# Patient Record
Sex: FEMALE | Race: BLACK OR AFRICAN AMERICAN | NOT HISPANIC OR LATINO
[De-identification: names, ages, dates, MRNs, and addresses within clinical notes are randomized per-mention and may not be internally consistent; named-entity substitution may affect disease eponyms.]

---

## 2018-02-12 ENCOUNTER — RESULT REVIEW (OUTPATIENT)
Age: 28
End: 2018-02-12

## 2018-02-12 ENCOUNTER — OUTPATIENT (OUTPATIENT)
Dept: OUTPATIENT SERVICES | Facility: HOSPITAL | Age: 28
LOS: 1 days | Discharge: HOME | End: 2018-02-12

## 2018-02-12 DIAGNOSIS — Z01.419 ENCOUNTER FOR GYNECOLOGICAL EXAMINATION (GENERAL) (ROUTINE) WITHOUT ABNORMAL FINDINGS: ICD-10-CM

## 2019-02-05 PROBLEM — Z00.00 ENCOUNTER FOR PREVENTIVE HEALTH EXAMINATION: Status: ACTIVE | Noted: 2019-02-05

## 2019-02-15 ENCOUNTER — OUTPATIENT (OUTPATIENT)
Dept: OUTPATIENT SERVICES | Facility: HOSPITAL | Age: 29
LOS: 1 days | Discharge: HOME | End: 2019-02-15

## 2019-02-15 ENCOUNTER — APPOINTMENT (OUTPATIENT)
Dept: OBGYN | Facility: CLINIC | Age: 29
End: 2019-02-15

## 2019-02-15 ENCOUNTER — TRANSCRIPTION ENCOUNTER (OUTPATIENT)
Age: 29
End: 2019-02-15

## 2019-02-15 VITALS
DIASTOLIC BLOOD PRESSURE: 77 MMHG | BODY MASS INDEX: 31.34 KG/M2 | HEIGHT: 66 IN | SYSTOLIC BLOOD PRESSURE: 117 MMHG | WEIGHT: 195 LBS

## 2019-02-15 DIAGNOSIS — Z01.419 ENCOUNTER FOR GYNECOLOGICAL EXAMINATION (GENERAL) (ROUTINE) WITHOUT ABNORMAL FINDINGS: ICD-10-CM

## 2019-02-15 DIAGNOSIS — Z86.03 PERSONAL HISTORY OF NEOPLASM OF UNCERTAIN BEHAVIOR: ICD-10-CM

## 2019-02-15 NOTE — PROCEDURE
[Pelvic Pain] : pelvic pain [Fibroid Uterus] : fibroid uterus [Pelvic Sonogram] : pelvic sonogram [Transvaginal Ultrasound] : transvaginal ultrasound [Present] : uterus present [Anteverted] : anteverted [L: ___ cm] : L: [unfilled] cm [W: ___cm] : W: [unfilled] cm [FreeTextEntry6] : borderline ovarian ca  [FreeTextEntry7] : surgical absent [FreeTextEntry8] : 5.51 [FreeTextEntry4] : fibroid uterus

## 2019-02-19 LAB
C TRACH RRNA SPEC QL NAA+PROBE: NOT DETECTED
N GONORRHOEA RRNA SPEC QL NAA+PROBE: NOT DETECTED
SOURCE AMPLIFICATION: NORMAL

## 2019-11-19 ENCOUNTER — TRANSCRIPTION ENCOUNTER (OUTPATIENT)
Age: 29
End: 2019-11-19

## 2019-11-26 ENCOUNTER — OUTPATIENT (OUTPATIENT)
Dept: OUTPATIENT SERVICES | Facility: HOSPITAL | Age: 29
LOS: 1 days | Discharge: HOME | End: 2019-11-26
Payer: COMMERCIAL

## 2019-11-26 DIAGNOSIS — R10.2 PELVIC AND PERINEAL PAIN: ICD-10-CM

## 2019-11-26 PROCEDURE — 76856 US EXAM PELVIC COMPLETE: CPT | Mod: 26

## 2019-11-26 PROCEDURE — 76830 TRANSVAGINAL US NON-OB: CPT | Mod: 26

## 2019-12-17 ENCOUNTER — APPOINTMENT (OUTPATIENT)
Dept: OBGYN | Facility: CLINIC | Age: 29
End: 2019-12-17
Payer: COMMERCIAL

## 2019-12-17 VITALS
WEIGHT: 190 LBS | HEIGHT: 66 IN | BODY MASS INDEX: 30.53 KG/M2 | DIASTOLIC BLOOD PRESSURE: 77 MMHG | SYSTOLIC BLOOD PRESSURE: 120 MMHG

## 2019-12-17 DIAGNOSIS — N92.6 IRREGULAR MENSTRUATION, UNSPECIFIED: ICD-10-CM

## 2019-12-17 PROCEDURE — 99213 OFFICE O/P EST LOW 20 MIN: CPT

## 2019-12-17 NOTE — PROCEDURE
[Pelvic Mass] : pelvic mass [Fibroid Uterus] : fibroid uterus [Pelvic Sonogram] : pelvic sonogram [Present] : uterus present [Anteverted] : anteverted [L: ___ cm] : L: [unfilled] cm [W: ___cm] : W: [unfilled] cm [FreeTextEntry7] : abscent [FreeTextEntry8] : 5.74  3.86 cyst [FreeTextEntry4] : fibroid uterus with previous borderlin ca

## 2019-12-17 NOTE — PHYSICAL EXAM
[Awake] : awake [Alert] : alert [Soft] : soft [Oriented x3] : oriented to person, place, and time [Normal] : cervix [Uterine Adnexae] : were not tender and not enlarged [No Bleeding] : there was no active vaginal bleeding [Acute Distress] : no acute distress [Mass] : no breast mass [Nipple Discharge] : no nipple discharge [Axillary LAD] : no axillary lymphadenopathy [Tender] : non tender

## 2019-12-31 ENCOUNTER — APPOINTMENT (OUTPATIENT)
Dept: GYNECOLOGIC ONCOLOGY | Facility: CLINIC | Age: 29
End: 2019-12-31
Payer: COMMERCIAL

## 2019-12-31 ENCOUNTER — OUTPATIENT (OUTPATIENT)
Dept: OUTPATIENT SERVICES | Facility: HOSPITAL | Age: 29
LOS: 1 days | Discharge: HOME | End: 2019-12-31

## 2019-12-31 ENCOUNTER — LABORATORY RESULT (OUTPATIENT)
Age: 29
End: 2019-12-31

## 2019-12-31 VITALS
SYSTOLIC BLOOD PRESSURE: 120 MMHG | BODY MASS INDEX: 30.53 KG/M2 | RESPIRATION RATE: 14 BRPM | DIASTOLIC BLOOD PRESSURE: 79 MMHG | HEIGHT: 66 IN | HEART RATE: 75 BPM | WEIGHT: 190 LBS | TEMPERATURE: 97.6 F

## 2019-12-31 DIAGNOSIS — Z87.09 PERSONAL HISTORY OF OTHER DISEASES OF THE RESPIRATORY SYSTEM: ICD-10-CM

## 2019-12-31 DIAGNOSIS — Z80.3 FAMILY HISTORY OF MALIGNANT NEOPLASM OF BREAST: ICD-10-CM

## 2019-12-31 PROCEDURE — 58100 BIOPSY OF UTERUS LINING: CPT

## 2019-12-31 PROCEDURE — 99204 OFFICE O/P NEW MOD 45 MIN: CPT | Mod: 25

## 2019-12-31 RX ORDER — KETOROLAC TROMETHAMINE 10 MG/1
10 TABLET, FILM COATED ORAL EVERY 8 HOURS
Qty: 15 | Refills: 0 | Status: ACTIVE | COMMUNITY
Start: 2019-12-31 | End: 1900-01-01

## 2019-12-31 RX ORDER — NORGESTIMATE AND ETHINYL ESTRADIOL 0.25-0.035
KIT ORAL
Refills: 0 | Status: ACTIVE | COMMUNITY

## 2019-12-31 NOTE — PROCEDURE
[Endometrial Biopsy] : an endometrial biopsy [Patient] : the patient [Written consent] : written consent was obtained prior to the procedure and is detailed in the patient's record

## 2019-12-31 NOTE — HISTORY OF PRESENT ILLNESS
[FreeTextEntry1] : 30 yo G0\par Referred by Dr. 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 Presents for worsening abdominal pain, cramping, occ sharp, generalized abdomen, not related to menstrual bleeding. She has worsening vaginal bleeding, last month bled for a month straight every day. LMP was 12/9, she had inter menstrual spotting this month 2 days ago that now stopped. She is taking OCPs. She had an US in [facility] at the beginning of the year that showed a small fibroid, which was repeated in november and it showed interval growth. \par \par Fibroid uterus\par Left adnexal cyst \par Desires fertility\par \par 11/26/19:TVUS:  Uterus anteverted 12 x10.4 x 8.4cm, containing a 9.8cm x8.7x8.3cm intramural fibroid. right ovary surgically absent. left ovary 4.7x4.6x4.2cm and contains a 4cm cyst\par \par Last pap smear 2/2019 NILM\par

## 2019-12-31 NOTE — ASSESSMENT
[FreeTextEntry1] : 28 yo G0 h/o Borderline tumor of right ovary, s/p Oophorectomy, now with fibroid uterus, and left adnexal cyst, desiring fertility, for consult\par -Will send for pelvic MRI to further characterize the fibroid.  \par -discussed possibility of myomectomy if MRI is not suspicious for malignancy.  Also discussed non-surgical options including GNRH agonist and IR intervention\par -Patient stated she highly desires fertility and does not want to do anything to risk her ability of having kids.  When discussing myomectomy and the small possibility of hysterectomy due to bleeding, she was not sure if that risk is sufficient.  Would consider ASHLEY consultation if MRI is not suspicious for malignancy\par -Patient to follow up after MRI in 3-4 weeks

## 2019-12-31 NOTE — PHYSICAL EXAM
[Normal] : Bimanual Exam: Normal [Abnormal] : Uterus: Abnormal [de-identified] : enlarged 15 cm sized [de-identified] : Scant tissue from Embx

## 2019-12-31 NOTE — DISCUSSION/SUMMARY
[FreeTextEntry1] : 29 G0 with enlarging fibroid uterus and abnormal bleeding on OCPs\par -MRI pelvis w/ IV contrast\par -EmBx\par -pt has ibuprofen allergy but has tolerated aleve without issue, sent toradol x5 days to take for pain with the next period\par -f/u in 3wks after MRI

## 2020-01-02 DIAGNOSIS — D25.1 INTRAMURAL LEIOMYOMA OF UTERUS: ICD-10-CM

## 2020-01-02 DIAGNOSIS — Z87.09 PERSONAL HISTORY OF OTHER DISEASES OF THE RESPIRATORY SYSTEM: ICD-10-CM

## 2020-01-02 DIAGNOSIS — Z80.3 FAMILY HISTORY OF MALIGNANT NEOPLASM OF BREAST: ICD-10-CM

## 2020-01-17 ENCOUNTER — OUTPATIENT (OUTPATIENT)
Dept: OUTPATIENT SERVICES | Facility: HOSPITAL | Age: 30
LOS: 1 days | Discharge: HOME | End: 2020-01-17
Payer: COMMERCIAL

## 2020-01-17 DIAGNOSIS — D25.1 INTRAMURAL LEIOMYOMA OF UTERUS: ICD-10-CM

## 2020-01-17 PROCEDURE — 72196 MRI PELVIS W/DYE: CPT | Mod: 26

## 2020-02-04 ENCOUNTER — OUTPATIENT (OUTPATIENT)
Dept: OUTPATIENT SERVICES | Facility: HOSPITAL | Age: 30
LOS: 1 days | Discharge: HOME | End: 2020-02-04

## 2020-02-04 ENCOUNTER — APPOINTMENT (OUTPATIENT)
Dept: GYNECOLOGIC ONCOLOGY | Facility: CLINIC | Age: 30
End: 2020-02-04
Payer: COMMERCIAL

## 2020-02-04 VITALS
RESPIRATION RATE: 14 BRPM | HEIGHT: 66 IN | SYSTOLIC BLOOD PRESSURE: 129 MMHG | HEART RATE: 90 BPM | TEMPERATURE: 98 F | DIASTOLIC BLOOD PRESSURE: 79 MMHG | BODY MASS INDEX: 30.53 KG/M2 | WEIGHT: 190 LBS

## 2020-02-04 PROCEDURE — 99213 OFFICE O/P EST LOW 20 MIN: CPT

## 2020-02-06 NOTE — HISTORY OF PRESENT ILLNESS
[FreeTextEntry1] : Late entry.  Patient seen on 2/4/2020\par \par 28 yo G0 presents for follow up after MRI.  MRI reviewed, mass consistent with fibroid, no features concerning for malignancy.  Fibroid appears to be intramural.\par \par Patient without new complaints.  No new interval history\par \par Previous history:\par Referred by Dr. 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 Presents for worsening abdominal pain, cramping, occ sharp, generalized abdomen, not related to menstrual bleeding. She has worsening vaginal bleeding, last month bled for a month straight every day. LMP was 12/9, she had inter menstrual spotting this month 2 days ago that now stopped. She is taking OCPs. She had an US in [facility] at the beginning of the year that showed a small fibroid, which was repeated in november and it showed interval growth. \par \par Fibroid uterus\par Left adnexal cyst \par Desires fertility\par \par 11/26/19:TVUS:  Uterus anteverted 12 x10.4 x 8.4cm, containing a 9.8cm x8.7x8.3cm intramural fibroid. right ovary surgically absent. left ovary 4.7x4.6x4.2cm and contains a 4cm cyst\par \par Last pap smear 2/2019 NILM\par

## 2020-02-06 NOTE — PROCEDURE
[Patient] : the patient [Endometrial Biopsy] : an endometrial biopsy [Written consent] : written consent was obtained prior to the procedure and is detailed in the patient's record

## 2020-02-06 NOTE — ASSESSMENT
[FreeTextEntry1] : 28 yo G0 h/o Borderline tumor of right ovary, s/p Oophorectomy, now with fibroid uterus, and left adnexal cyst, desiring fertility, for consult\par -Reviewed results of MRI.  Very low suspicion for malignancy.  Given the fibroid and desire for future fertility, we recommended abdominal myomectomy.  Given the size of the lesion, recommended an open approach as laparoscopic myomectomy would be difficult.  Again discussed the small but significant risk of hysterectomy if bleeding is uncontrollable.  All risks/benefits/alternatives discussed.  Patient stated she would agree to myomectomy, but is unsure when she would want to have it done.  As there is low risk for malignancy, I informed her that waiting for a few months is reasonable.  She would consider her schedule and come to a decision at next visit\par -follow up in 6-8 weeks

## 2020-02-07 DIAGNOSIS — D25.1 INTRAMURAL LEIOMYOMA OF UTERUS: ICD-10-CM

## 2020-03-23 ENCOUNTER — RX RENEWAL (OUTPATIENT)
Age: 30
End: 2020-03-23

## 2020-04-14 ENCOUNTER — APPOINTMENT (OUTPATIENT)
Dept: GYNECOLOGIC ONCOLOGY | Facility: CLINIC | Age: 30
End: 2020-04-14

## 2020-05-26 ENCOUNTER — APPOINTMENT (OUTPATIENT)
Dept: GYNECOLOGIC ONCOLOGY | Facility: CLINIC | Age: 30
End: 2020-05-26
Payer: COMMERCIAL

## 2020-05-26 DIAGNOSIS — N94.9 UNSPECIFIED CONDITION ASSOCIATED WITH FEMALE GENITAL ORGANS AND MENSTRUAL CYCLE: ICD-10-CM

## 2020-05-26 NOTE — HISTORY OF PRESENT ILLNESS
[FreeTextEntry1] : 29yo with enlarging fibroid uterus and left adnexal cyst with low likelihood of malignancy desiring fertility with hx of borderline papillary serous tumor of right ovary s/p RSO and left ovarian cystectomy (2008, info in [employer]).\par \par Endometrial biopsy done on 12/31/19: tissue less than optimal for endometrial evaluation, scant benign endocervical epithelium.\par \par 11/26/19:TVUS: Uterus anteverted 12 x10.4 x 8.4cm, containing a 9.8cm x8.7x8.3cm intramural fibroid. right ovary surgically absent. left ovary 4.7x4.6x4.2cm and contains a 4cm cyst\par \par 12/17/19 Office TAUS: Uterus anteverted 10.3 x 2.42cm, left ovary 5.74 x 2.86cm. \par \par Last pap smear 2/2019 NILM\par \par Did not go for MRI??\par

## 2020-06-09 ENCOUNTER — APPOINTMENT (OUTPATIENT)
Dept: GYNECOLOGIC ONCOLOGY | Facility: CLINIC | Age: 30
End: 2020-06-09
Payer: COMMERCIAL

## 2020-06-09 ENCOUNTER — OUTPATIENT (OUTPATIENT)
Dept: OUTPATIENT SERVICES | Facility: HOSPITAL | Age: 30
LOS: 1 days | Discharge: HOME | End: 2020-06-09

## 2020-06-09 VITALS
HEART RATE: 86 BPM | TEMPERATURE: 99.3 F | BODY MASS INDEX: 32.78 KG/M2 | HEIGHT: 66 IN | WEIGHT: 204 LBS | SYSTOLIC BLOOD PRESSURE: 135 MMHG | DIASTOLIC BLOOD PRESSURE: 73 MMHG

## 2020-06-09 PROCEDURE — 99213 OFFICE O/P EST LOW 20 MIN: CPT

## 2020-06-17 ENCOUNTER — RX RENEWAL (OUTPATIENT)
Age: 30
End: 2020-06-17

## 2020-06-17 RX ORDER — NORETHINDRONE ACETATE AND ETHINYL ESTRADIOL AND FERROUS FUMARATE 1MG-20(21)
1-20 KIT ORAL
Qty: 3 | Refills: 3 | Status: ACTIVE | COMMUNITY
Start: 2020-03-23 | End: 1900-01-01

## 2020-06-18 NOTE — HISTORY OF PRESENT ILLNESS
[FreeTextEntry1] : 29 yo with fibroid uterus and left adnexal cyst with low likelihood of malignancy desiring fertility. She has h/o borderline papillary serous tumor of right ovary s/p ex-lap RSO and left ovarian cystectomy (2008, info in [employer]).   \par \par Endometrial biopsy done on 12/31/19: tissue less than optimal for endometrial evaluation, scant benign endocervical epithelium. \par \par 11/26/19: TVUS: Uterus anteverted 12 x10.4 x 8.4cm, containing a 9.8cm x8.7x8.3cm intramural fibroid. right ovary surgically absent. left ovary 4.7x4.6x4.2cm and contains a 4cm cyst \par \par 12/17/19 Office TAUS: Uterus anteverted 10.3 x 2.42cm, left ovary 5.74 x 2.86cm.  \par \par Last pap smear 2/2019 NILM \par \par Today she complains of intermittent cramping abdominal pain that has been unchanged over the last few months. It is bothersome to her and interferes with her work. She has been on OCPs and denies abnormal vaginal bleeding.

## 2020-06-18 NOTE — DISCUSSION/SUMMARY
[FreeTextEntry1] : 29yo with fibroid uterus\par -patient is interested in myomectomy; however she is starting a new job and is uncertain when she can get time off for the procedure\par -patient instructed to contact us when she makes a decision.  Otherwise, follow up in 3 months

## 2020-06-18 NOTE — END OF VISIT
[] : Resident [Time Spent: ___ minutes] : I have spent [unfilled] minutes of time on the encounter. [FreeTextEntry3] : Patient still uncertain about timing of surgery, but is interested in an abdominal myomectomy.  She stated she is starting a new position at work and does not want to take time off right now so that she does not risk her employment.  Overall, given the previous imaging, there is a very low suspicion for malignancy.  I informed the patient that as soon as she knows when she can take time off to contact me so that we can schedule surgery.   [>50% of the face to face encounter time was spent on counseling and/or coordination of care for ___] : Greater than 50% of the face to face encounter time was spent on counseling and/or coordination of care for [unfilled]

## 2020-06-18 NOTE — PHYSICAL EXAM
[Normal] : Cervix: Normal, no lesions [Abnormal] : Uterus: Abnormal [de-identified] : Round, globular 20 week size uterus [de-identified] : Palpable mass at the umbilicus consistent with fibroid uterus

## 2020-06-30 DIAGNOSIS — D25.1 INTRAMURAL LEIOMYOMA OF UTERUS: ICD-10-CM

## 2020-09-08 ENCOUNTER — APPOINTMENT (OUTPATIENT)
Dept: GYNECOLOGIC ONCOLOGY | Facility: CLINIC | Age: 30
End: 2020-09-08

## 2021-12-02 ENCOUNTER — EMERGENCY (EMERGENCY)
Facility: HOSPITAL | Age: 31
LOS: 0 days | Discharge: HOME | End: 2021-12-02
Attending: EMERGENCY MEDICINE | Admitting: EMERGENCY MEDICINE
Payer: COMMERCIAL

## 2021-12-02 VITALS
WEIGHT: 199.96 LBS | DIASTOLIC BLOOD PRESSURE: 74 MMHG | OXYGEN SATURATION: 99 % | RESPIRATION RATE: 18 BRPM | SYSTOLIC BLOOD PRESSURE: 134 MMHG | TEMPERATURE: 98 F | HEART RATE: 65 BPM | HEIGHT: 66 IN

## 2021-12-02 DIAGNOSIS — M25.531 PAIN IN RIGHT WRIST: ICD-10-CM

## 2021-12-02 DIAGNOSIS — M79.89 OTHER SPECIFIED SOFT TISSUE DISORDERS: ICD-10-CM

## 2021-12-02 PROCEDURE — 99282 EMERGENCY DEPT VISIT SF MDM: CPT

## 2021-12-02 NOTE — ED PROVIDER NOTE - CARE PROVIDER_API CALL
Darian Benton)  Orthopaedic Surgery  [address] [place] [address]  Phone: [phone]  Fax: [phone]  Follow Up Time: 1-3 Days

## 2021-12-02 NOTE — ED PROVIDER NOTE - NSFOLLOWUPINSTRUCTIONS_ED_ALL_ED_FT
****Tylenol as needed for pain****    Wrist Pain, Adult    There are many things that can cause wrist pain. Some common causes include:  •An injury to the wrist area, such as a sprain, strain, or fracture.      •Overuse of the joint.      •A condition that causes increased pressure on a nerve in the wrist (carpal tunnel syndrome).      •Wear and tear of the joints that occurs with aging (osteoarthritis).      •Other types of joint inflammation and stiffness (arthritis).      Sometimes, the cause of wrist pain is not known. Often, the pain goes away when you follow instructions from your health care provider for relieving pain at home, such as resting the wrist, icing the wrist, or using a splint or an elastic wrap for a short time. If your wrist pain continues, it is important to tell your health care provider.      Follow these instructions at home:    If you have a splint or elastic wrap:     •Wear the splint or wrap as told by your health care provider. Remove it only as told by your health care provider. Ask your health care provider if you may remove it for bathing.      •Loosen the splint or wrap if your fingers tingle, become numb, or turn cold and blue.      •Check the skin around the splint or wrap every day. Tell your health care provider about any concerns.      •Keep the splint or wrap clean.    •If the splint or wrap is not waterproof:  •Do not let it get wet.      •Cover it with a watertight covering when you take a bath or shower.          Managing pain, stiffness, and swelling    •If directed, put ice on the painful area. To do this:  •If you have a removable splint or wrap, remove it as told by your health care provider.      •Put ice in a plastic bag.      •Place a towel between your skin and the bag or between your splint or wrap and the bag.      •Leave the ice on for 20 minutes, 2–3 times a day.        •Move your fingers often to reduce stiffness and swelling.      •Raise (elevate) the injured area above the level of your heart while you are sitting or lying down.      Activity     •Rest your affected wrist as told by your health care provider.      •Return to your normal activities as told by your health care provider. Ask your health care provider what activities are safe for you.      •Ask your health care provider when it is safe to drive if you have a splint or wrap on your wrist.      •Do exercises as told by your health care provider.      General instructions     •Pay attention to any changes in your symptoms.      •Take over-the-counter and prescription medicines only as told by your health care provider.      •Keep all follow-up visits as told by your health care provider. This is important.        Contact a health care provider if:    •You have a sudden, sharp pain in the wrist, hand, or arm that is different or new.      •The swelling or bruising on your wrist or hand gets worse.      •Your skin becomes red, gets a rash, or has open sores.      •Your pain does not get better or it gets worse.      •You have a fever or chills.        Get help right away if:    •You lose feeling in your fingers or hand.      •Your fingers turn white, very red, or cold and blue.      •You cannot move your fingers.        Summary    •Wrist pain in an adult has many different causes.      •If your wrist pain continues, it is important to tell your health care provider.      •You may need to wear a splint or an elastic wrap for a short period of time.      •Return to your normal activities as told by your health care provider. Ask your health care provider what activities are safe for you.      This information is not intended to replace advice given to you by your health care provider. Make sure you discuss any questions you have with your health care provider.      Document Revised: 11/05/2020 Document Reviewed: 11/05/2020    Elsevier Patient Education © 2021 [employer]

## 2021-12-02 NOTE — ED PROVIDER NOTE - PHYSICAL EXAMINATION
VSS, awake, alert, in NAD, no skin lacerations or abrasions. LUE; no shoulder, elbow, hand tenderness, wrist; appears symmetrical, no gross deformity, crepitus, swelling, tenderness or snuffbox tenderness, ROM intact, no pain with passive ROM, compartments non-tense, sensory and motor function to radial, median and ulnar nerve of hand intact. AB/AD duction, flexion, extension of digits and opposition of thumb normal, NV intact, capillary refill <3 seconds, radial pulses 2+, pain not out of proportion to exam not appreciated. No warmth, erythema, induration, fluctuance, lymphangitis or purulence.

## 2021-12-02 NOTE — ED PROVIDER NOTE - PATIENT PORTAL LINK FT
You can access the FollowMyHealth Patient Portal offered by [facility] by registering at the following website: [email or web address]. By joining [employer]’s FollowMyHealth portal, you will also be able to view your health information using other applications (apps) compatible with our system.

## 2021-12-02 NOTE — ED PROVIDER NOTE - ATTENDING CONTRIBUTION TO CARE
32 y/o female denies sig PMH p/w rt wrist pain intermittently x 1 year, worse x 1 week, worse with increased use of hands, states she notices isolated swelling over dorsum of distal radius intermittently which flares up and relieved by compression / naproxen. denies fever, tactile temp, chills, paresthesias, focal weakness, or other associated complaints at present. Pt denies recent heavy lifting or trauma.     No old chart available for review.  I have reviewed and agree with the initial nursing note, except as documented in my note.    VSS, awake, alert, in NAD, no skin lacerations or abrasions. LUE; no shoulder, elbow, hand tenderness, wrist; appears symmetrical, no gross deformity, crepitus, swelling, tenderness or snuffbox tenderness, ROM intact, no pain with passive ROM, compartments non-tense, sensory and motor function to radial, median and ulnar nerve of hand intact. AB/AD duction, flexion, extension of digits and opposition of thumb normal, NV intact, capillary refill <3 seconds, radial pulses 2+, pain not out of proportion to exam not appreciated. No warmth, erythema, induration, fluctuance, lymphangitis or purulence.

## 2022-01-13 ENCOUNTER — NON-APPOINTMENT (OUTPATIENT)
Age: 32
End: 2022-01-13

## 2022-01-13 ENCOUNTER — LABORATORY RESULT (OUTPATIENT)
Age: 32
End: 2022-01-13

## 2022-01-13 ENCOUNTER — APPOINTMENT (OUTPATIENT)
Dept: OBGYN | Facility: CLINIC | Age: 32
End: 2022-01-13
Payer: COMMERCIAL

## 2022-01-13 VITALS
SYSTOLIC BLOOD PRESSURE: 130 MMHG | WEIGHT: 204 LBS | BODY MASS INDEX: 32.78 KG/M2 | HEIGHT: 66 IN | DIASTOLIC BLOOD PRESSURE: 70 MMHG

## 2022-01-13 DIAGNOSIS — D25.1 INTRAMURAL LEIOMYOMA OF UTERUS: ICD-10-CM

## 2022-01-13 PROCEDURE — 76830 TRANSVAGINAL US NON-OB: CPT

## 2022-01-13 PROCEDURE — 99395 PREV VISIT EST AGE 18-39: CPT | Mod: 25

## 2022-01-13 NOTE — PROCEDURE
[Pelvic Pain] : pelvic pain [Fibroid Uterus] : fibroid uterus [Transvaginal Ultrasound] : transvaginal ultrasound [Anteverted] : anteverted [L: ___ cm] : L: [unfilled] cm [W: ___cm] : W: [unfilled] cm [FreeTextEntry8] : 4.17 [FreeTextEntry6] : =intrmural fibroid   no free fluid [FreeTextEntry4] : fibroid uterus.

## 2022-01-17 LAB — HPV HIGH+LOW RISK DNA PNL CVX: DETECTED

## 2022-01-19 LAB — CYTOLOGY CVX/VAG DOC THIN PREP: ABNORMAL

## 2022-08-12 ENCOUNTER — RX RENEWAL (OUTPATIENT)
Age: 32
End: 2022-08-12

## 2022-08-12 RX ORDER — NORETHINDRONE ACETATE AND ETHINYL ESTRADIOL AND FERROUS FUMARATE 1MG-20(21)
1-20 KIT ORAL DAILY
Qty: 84 | Refills: 3 | Status: ACTIVE | COMMUNITY
Start: 2021-08-23 | End: 1900-01-01

## 2022-08-24 NOTE — ED PROVIDER NOTE - OBJECTIVE STATEMENT
32 y/o female denies sig PMH p/w rt wrist pain intermittently x 1 year, worse x 1 week, worse with increased use of hands, states she notices isolated swelling over dorsum of distal radius intermittently which flares up and relieved by compression / naproxen. denies fever, tactile temp, chills, paresthesias, focal weakness, or other associated complaints at present. Pt denies recent heavy lifting or trauma.     No old chart available for review.  I have reviewed and agree with the initial nursing note, except as documented in my note.
Vital Signs Last 24 Hrs  T(C): 36.8 (24 Aug 2022 00:28), Max: 36.8 (24 Aug 2022 00:28)  T(F): 98.2 (24 Aug 2022 00:28), Max: 98.2 (24 Aug 2022 00:28)  HR: 74 (24 Aug 2022 03:03) (70 - 75)  BP: 99/54 (24 Aug 2022 03:03) (89/51 - 99/54)  RR: 16 (24 Aug 2022 00:28) (16 - 16)    Gen: NAD  Head: NC/AT  Cardio: RRR  Resp: Clear lung sound bilaterally  Abdomen: Soft, non tender  Extremities: No LE edema bilaterally  NST--FHR: 130 HR baseline, moderate variability, accelerations present, no decelerations, Category 1.  Parks: no Contractions present  TAUS: cephalic presentation, Fundal placenta, LA NENA 14.73, BPP 8/8  SSE: scant amount of leukorrhea, cervix appear close. no bleeding noted, FFN collected and held  TVUS: cervical length 4.99-5.08, no dynamical changes noted.

## 2023-08-03 RX ORDER — NORETHINDRONE ACETATE AND ETHINYL ESTRADIOL AND FERROUS FUMARATE 1MG-20(21)
1-20 KIT ORAL DAILY
Qty: 1 | Refills: 3 | Status: ACTIVE | COMMUNITY
Start: 2023-08-03 | End: 1900-01-01

## 2023-09-06 ENCOUNTER — LABORATORY RESULT (OUTPATIENT)
Age: 33
End: 2023-09-06

## 2023-09-07 ENCOUNTER — APPOINTMENT (OUTPATIENT)
Dept: OBGYN | Facility: CLINIC | Age: 33
End: 2023-09-07
Payer: COMMERCIAL

## 2023-09-07 VITALS
BODY MASS INDEX: 28.93 KG/M2 | DIASTOLIC BLOOD PRESSURE: 75 MMHG | HEIGHT: 66 IN | SYSTOLIC BLOOD PRESSURE: 120 MMHG | WEIGHT: 180 LBS

## 2023-09-07 DIAGNOSIS — Z01.419 ENCOUNTER FOR GYNECOLOGICAL EXAMINATION (GENERAL) (ROUTINE) W/OUT ABNORMAL FINDINGS: ICD-10-CM

## 2023-09-07 PROCEDURE — ZZZZZ: CPT

## 2023-09-07 RX ORDER — NORETHINDRONE ACETATE AND ETHINYL ESTRADIOL AND FERROUS FUMARATE 1MG-20(21)
1-20 KIT ORAL DAILY
Qty: 3 | Refills: 3 | Status: ACTIVE | COMMUNITY
Start: 2023-09-07 | End: 1900-01-01

## 2023-09-10 LAB — HPV HIGH+LOW RISK DNA PNL CVX: DETECTED

## 2023-09-11 ENCOUNTER — APPOINTMENT (OUTPATIENT)
Dept: RADIOLOGY | Facility: CLINIC | Age: 33
End: 2023-09-11
Payer: OTHER MISCELLANEOUS

## 2023-09-11 ENCOUNTER — APPOINTMENT (OUTPATIENT)
Dept: PAIN MANAGEMENT | Facility: CLINIC | Age: 33
End: 2023-09-11
Payer: OTHER MISCELLANEOUS

## 2023-09-11 VITALS — BODY MASS INDEX: 28.93 KG/M2 | HEIGHT: 66 IN | WEIGHT: 180 LBS

## 2023-09-11 PROCEDURE — 72110 X-RAY EXAM L-2 SPINE 4/>VWS: CPT

## 2023-09-11 PROCEDURE — 99245 OFF/OP CONSLTJ NEW/EST HI 55: CPT

## 2023-09-14 ENCOUNTER — APPOINTMENT (OUTPATIENT)
Dept: PAIN MANAGEMENT | Facility: CLINIC | Age: 33
End: 2023-09-14
Payer: COMMERCIAL

## 2023-09-14 VITALS — HEIGHT: 66 IN | WEIGHT: 180 LBS | BODY MASS INDEX: 28.93 KG/M2

## 2023-09-14 PROCEDURE — 99213 OFFICE O/P EST LOW 20 MIN: CPT

## 2023-09-19 LAB — CYTOLOGY CVX/VAG DOC THIN PREP: ABNORMAL

## 2023-11-02 ENCOUNTER — APPOINTMENT (OUTPATIENT)
Dept: PAIN MANAGEMENT | Facility: CLINIC | Age: 33
End: 2023-11-02
Payer: OTHER MISCELLANEOUS

## 2023-11-02 VITALS — WEIGHT: 180 LBS | HEIGHT: 66 IN | BODY MASS INDEX: 28.93 KG/M2

## 2023-11-02 PROCEDURE — 99214 OFFICE O/P EST MOD 30 MIN: CPT | Mod: ACP

## 2023-11-02 RX ORDER — NORTRIPTYLINE HYDROCHLORIDE 25 MG/1
25 CAPSULE ORAL
Qty: 30 | Refills: 1 | Status: ACTIVE | COMMUNITY
Start: 2023-09-11 | End: 1900-01-01

## 2023-11-02 RX ORDER — TIZANIDINE 4 MG/1
4 TABLET ORAL TWICE DAILY
Qty: 60 | Refills: 0 | Status: ACTIVE | COMMUNITY
Start: 2023-11-02 | End: 1900-01-01

## 2023-12-14 ENCOUNTER — APPOINTMENT (OUTPATIENT)
Dept: PAIN MANAGEMENT | Facility: CLINIC | Age: 33
End: 2023-12-14
Payer: OTHER MISCELLANEOUS

## 2023-12-14 VITALS — WEIGHT: 180 LBS | HEIGHT: 66 IN | BODY MASS INDEX: 28.93 KG/M2

## 2023-12-14 DIAGNOSIS — M54.16 RADICULOPATHY, LUMBAR REGION: ICD-10-CM

## 2023-12-14 DIAGNOSIS — M46.00 SPINAL ENTHESOPATHY, SITE UNSPECIFIED: ICD-10-CM

## 2023-12-14 PROCEDURE — 99213 OFFICE O/P EST LOW 20 MIN: CPT | Mod: ACP

## 2023-12-14 NOTE — PHYSICAL EXAM
[de-identified] : BACK - tenderness into the lumbar paraspinals. ROM restricted. Pain with flexion. Positive SLR on the right.  [] : pain with lateral rotation [Flexion] : flexion [Extension] : extension

## 2023-12-14 NOTE — DISCUSSION/SUMMARY
[Medication Risks Reviewed] : Medication risks reviewed [de-identified] : 33-year-old female presenting with lumbar radicular pain secondary to a work-related injury that occurred back in May 2023. Patient is to continue Physical Therapy and continue Nortriptyline and  Tizanidine . We will re-evaluate in 6 weeks.  Disability status: Mild partial disability.  Patient is cleared to go back to work with restrictions: no heavy lifting/pulling/pushing over 15Lb.  Total clinician time spent today on the patient is 30 minutes including preparing to see the patient, obtaining and/or reviewing and confirming history, performing medically necessary and appropriate examination, counseling and educating the patient and/or family, documenting clinical information in the EHR and communicating and/or referring to other healthcare professionals.  WINSTON Paulino,DO

## 2023-12-14 NOTE — HISTORY OF PRESENT ILLNESS
[FreeTextEntry1] : Ms. Blanco is a 33 year old female presenting to our walk-in clinic to establish care for her lower back pain.  She is an [employer] worker who sustained an injury on the job on May 2023.  She continued to work with accommodations until 9/4/2023.  At that time, her injury developed in her lower back and travels into her right leg.  She went to urgent care in May and was prescribed oral medications which helped minimally.  Currently, she rates her pain as a 9 out of 10 on the pain scale.  She states pain is constant in nature and travels into the right leg.  She has numbness, tingling along with pulling like sensations extending into the calf.  She states she has significant trouble with her ADLs.  She denies any red flags.  TODAY: Last seen on 11/02/2023 and since then there has been no new complaints or acute changes to her condition. She started Physical Therapy with moderate improvement of at least 65% in both pain and functioning. She also takes Nortriptyline and Tizanidine with additional relief.

## 2024-01-18 ENCOUNTER — APPOINTMENT (OUTPATIENT)
Dept: PAIN MANAGEMENT | Facility: CLINIC | Age: 34
End: 2024-01-18

## 2024-08-26 ENCOUNTER — RX RENEWAL (OUTPATIENT)
Age: 34
End: 2024-08-26

## 2024-08-26 RX ORDER — NORETHINDRONE ACETATE AND ETHINYL ESTRADIOL AND FERROUS FUMARATE 1MG-20(21)
1-20 KIT ORAL
Qty: 84 | Refills: 3 | Status: ACTIVE | COMMUNITY
Start: 2024-08-26 | End: 1900-01-01